# Patient Record
Sex: FEMALE
[De-identification: names, ages, dates, MRNs, and addresses within clinical notes are randomized per-mention and may not be internally consistent; named-entity substitution may affect disease eponyms.]

---

## 2023-07-13 ENCOUNTER — DASHBOARD ENCOUNTERS (OUTPATIENT)
Age: 33
End: 2023-07-13

## 2023-07-13 ENCOUNTER — WEB ENCOUNTER (OUTPATIENT)
Dept: URBAN - METROPOLITAN AREA CLINIC 82 | Facility: CLINIC | Age: 33
End: 2023-07-13

## 2023-07-13 ENCOUNTER — OFFICE VISIT (OUTPATIENT)
Dept: URBAN - METROPOLITAN AREA CLINIC 82 | Facility: CLINIC | Age: 33
End: 2023-07-13
Payer: COMMERCIAL

## 2023-07-13 VITALS
DIASTOLIC BLOOD PRESSURE: 90 MMHG | BODY MASS INDEX: 44.65 KG/M2 | TEMPERATURE: 98 F | SYSTOLIC BLOOD PRESSURE: 133 MMHG | WEIGHT: 268 LBS | HEIGHT: 65 IN

## 2023-07-13 DIAGNOSIS — R10.12 LUQ ABDOMINAL PAIN: ICD-10-CM

## 2023-07-13 DIAGNOSIS — K21.9 GASTROESOPHAGEAL REFLUX DISEASE, UNSPECIFIED WHETHER ESOPHAGITIS PRESENT: ICD-10-CM

## 2023-07-13 DIAGNOSIS — R11.0 NAUSEA: ICD-10-CM

## 2023-07-13 PROBLEM — 301715003: Status: ACTIVE | Noted: 2023-07-13

## 2023-07-13 PROBLEM — 235595009: Status: ACTIVE | Noted: 2023-07-13

## 2023-07-13 PROBLEM — 422587007: Status: ACTIVE | Noted: 2023-07-13

## 2023-07-13 PROCEDURE — 99203 OFFICE O/P NEW LOW 30 MIN: CPT | Performed by: INTERNAL MEDICINE

## 2023-07-13 RX ORDER — PANTOPRAZOLE SODIUM 40 MG/1
1 TABLET TABLET, DELAYED RELEASE ORAL ONCE A DAY
Qty: 90 TABLET | Refills: 0 | OUTPATIENT
Start: 2023-07-13

## 2023-07-13 RX ORDER — ONDANSETRON 8 MG/1
1 TABLET ON THE TONGUE AND ALLOW TO DISSOLVE  AS NEEDED TABLET, ORALLY DISINTEGRATING ORAL
Qty: 30 TABLET | Refills: 0 | OUTPATIENT
Start: 2023-07-13

## 2023-07-13 NOTE — PHYSICAL EXAM NECK/THYROID:
normal appearance , without tenderness upon palpation , no deformities , trachea midline , Thyroid normal size , no masses , thyroid nontender SCAR FROM PRIOR TRACHEOSTOMY NOTED

## 2023-07-13 NOTE — HPI-TODAY'S VISIT:
Ms. Queen is a 32-year-old female came to the office for complaints of having burping and belching as well as nausea vomiting intermittently for the past 6 weeks.  Patient stated she has had occasional reflux and indigestion however past 6 weeks her symptoms have been worse.  Denies any increased stress or denies any chest pain shortness of breath.  Patient reports having intermittent dysphagia also improves when she eats soft foods and also when she eats slowly.  Patient denies any prior history of eosinophilic esophagitis denies any prior history of peptic ulcer disease.  Patient reports having prior gallbladder surgery 10 years ago.  Other complaints also includes left upper quadrant abdominal discomfort and pain sometimes as stabbing and sometimes discomfort nonradiating except into the epigastric region.  Patient denies any chest pain denies any daily NSAIDs however she reports to using NSAIDs elevated monthly cycles.  Her uncle had similar symptoms when he was told to have reflux symptoms.  Patient denies any prior history of peptic ulcer disease.  Denies any melanotic stools or bright red per rectum.

## 2023-07-21 LAB
A/G RATIO: 1.4
ABSOLUTE BASOPHILS: 31
ABSOLUTE EOSINOPHILS: 277
ABSOLUTE LYMPHOCYTES: 2110
ABSOLUTE MONOCYTES: 501
ABSOLUTE NEUTROPHILS: 4782
ALBUMIN: 4
ALKALINE PHOSPHATASE: 79
ALT (SGPT): 10
AST (SGOT): 14
BASOPHILS: 0.4
BILIRUBIN, TOTAL: 0.7
BUN/CREATININE RATIO: (no result)
BUN: 10
CALCIUM: 9
CARBON DIOXIDE, TOTAL: 26
CHLORIDE: 107
CREATININE: 0.82
EGFR: 96
EOSINOPHILS: 3.6
GLOBULIN, TOTAL: 2.8
GLUCOSE: 82
H PYLORI BREATH TEST: NOT DETECTED
H. PYLORI BREATH TEST: NOT DETECTED
HEMATOCRIT: 35
HEMOGLOBIN: 11.5
INTERPRETATION: NOT DETECTED
LYMPHOCYTES: 27.4
MCH: 26.9
MCHC: 32.9
MCV: 82
MONOCYTES: 6.5
MPV: 9.9
NEUTROPHILS: 62.1
PLATELET COUNT: 268
POTASSIUM: 4.3
PROTEIN, TOTAL: 6.8
RDW: 13.7
RED BLOOD CELL COUNT: 4.27
SODIUM: 141
WHITE BLOOD CELL COUNT: 7.7

## 2025-02-03 ENCOUNTER — TELEPHONE ENCOUNTER (OUTPATIENT)
Dept: URBAN - METROPOLITAN AREA CLINIC 82 | Facility: CLINIC | Age: 35
End: 2025-02-03

## 2025-02-03 RX ORDER — AZITHROMYCIN MONOHYDRATE 250 MG/1
2 TABLETS DAY 1 AND 1 TABLET ONCE A DAY FOR 4 MORE DAYS TABLET, FILM COATED ORAL ONCE A DAY
Qty: 6 | Refills: 0 | OUTPATIENT
Start: 2025-02-03 | End: 2025-02-08

## 2025-02-25 ENCOUNTER — OFFICE VISIT (OUTPATIENT)
Dept: URBAN - METROPOLITAN AREA CLINIC 82 | Facility: CLINIC | Age: 35
End: 2025-02-25

## 2025-02-25 RX ORDER — PANTOPRAZOLE SODIUM 40 MG/1
1 TABLET TABLET, DELAYED RELEASE ORAL ONCE A DAY
Qty: 90 TABLET | Refills: 0 | Status: ACTIVE | COMMUNITY
Start: 2023-07-13

## 2025-02-25 RX ORDER — ONDANSETRON 8 MG/1
1 TABLET ON THE TONGUE AND ALLOW TO DISSOLVE  AS NEEDED TABLET, ORALLY DISINTEGRATING ORAL
Qty: 30 TABLET | Refills: 0 | Status: ACTIVE | COMMUNITY
Start: 2023-07-13